# Patient Record
(demographics unavailable — no encounter records)

---

## 2024-10-31 NOTE — HISTORY OF PRESENT ILLNESS
[FreeTextEntry1] : It is a pleasure to see Ms. SURAJ JAY in office today. She is a 75-year-old woman who presents today for a neurologic consultation. She has noticed that since a year ago, she has been forgetful and would sometimes forget what she is doing. She has noticed word-finding issues that she's never had before. She currently lives alone and does everything on her own. She has never had an MRI or EEG done. She had recent bloodwork in September which revealed high B12 levels. She takes daily multivitamins. Denies snoring in her sleep, waking up short of breath, or waking up tired. Highest education was an associate's degree.

## 2024-10-31 NOTE — PHYSICAL EXAM

## 2024-10-31 NOTE — ASSESSMENT
[FreeTextEntry1] : Cognitive impairment-considering her age and how functional she still is, it is more likely to be age-related memory decline -Recent bloodwork results on patient's phone reviewed   -MRI brain w/o contrast -Neuropsychological testing referral   -Bloodwork -EEG  -Advised to increase physical activity  -Follow-up in 1 month  High B12 level -Follow-up with her PMD or nutritionist  Total clinician time spent  is  46 minutes including preparing to see the patient, obtaining and/or reviewing and confirming history, performing a medically necessary and appropriate examination, counseling and educating the patient and/or family, documenting clinical information in the HER and communicating and/or referring to other healthcare professionals.    I, Madie Ledesma, attest that this documentation has been prepared under the direction and in the presence of Provider Buddy Cervantes DO   Thank you for allowing me to assist in the management of this patient.   Buddy Cervantes DO Board Certified, Neurology.

## 2024-11-26 NOTE — PHYSICAL EXAM
[Left Carotid Bruit] : left carotid bruit heard [2+] : left 2+ [Right Carotid Bruit] : no bruit heard over the right carotid

## 2024-11-26 NOTE — ASSESSMENT
[FreeTextEntry1] : The patient is a 75-year-old female who presents for follow-up of asymptomatic bilateral carotid artery stenosis.  I performed a carotid duplex today in my office which shows bilateral 50 to 69% stenosis.  I would like to continue to monitor the patient with a serial duplex exam every 6 months.  I recommend she continue with an antiplatelet regimen as well as lipid-lowering agent daily.     I, Dr. Justin Tabares, personally performed the evaluation and management (E/M) services for this established patient who presents today with (a) new problem(s)/exacerbation of (an) existing condition(s). That E/M includes conducting the clinically appropriate interval history &/or exam, assessing all new/exacerbated conditions, and establishing a new plan of care. Today, my BARBRA, Stacie Huffman PA-C, was here to observe my evaluation and management service for this new problem/exacerbated condition and follow the plan of care established by me going forward.

## 2025-01-03 NOTE — ASSESSMENT
[FreeTextEntry1] : Cognitive impairment-most likely age-related memory decline -MRI of the brain, EEG, blood work result reviewed with patient, and questions answered -Will obtain recent neuropsych testing result - Follow-up with neuropsychologist to review neuropsych testing  B12 toxicity - Recommend to stop multivitamin - Considering seeing a nutritionist if B12 continues to be high  Total clinician time spent  is  35 minutes including preparing to see the patient, obtaining and/or reviewing and confirming history, performing a medically necessary and appropriate examination, counseling and educating the patient and/or family, documenting clinical information in the HER and communicating and/or referring to other healthcare professionals.

## 2025-01-03 NOTE — HISTORY OF PRESENT ILLNESS
[FreeTextEntry1] : Ms. SURAJ JAY returns to the office for follow-up and her prior history and physical have been reviewed and she reports no change since last visit.  she has been seeing us for cognitive impairment and was sent for MRI of the brain which other than ischemia, cerebral meningioma that was seen in the previous MRI, sinus disease, no significant intracranial findings seen.  Her blood work continues to be positive for elevated B12 level, greater than 2000.  Continues to take a daily multivitamin which has B12 in it.  She did have a neuropsych testing with a neuropsychologist, but the report is not available to us at this time.   Blood work was also positive for grated AST/ALT, but she has a history of nonalcoholic steatohepatitis.

## 2025-01-03 NOTE — PHYSICAL EXAM

## 2025-05-29 NOTE — HISTORY OF PRESENT ILLNESS
[FreeTextEntry1] : Ms. Murdock is a 76 year old female with history of asymptomatic carotid artery stenosis presenting for annual follow up, denies any CVA or TIA symptoms.

## 2025-05-29 NOTE — DATA REVIEWED
[FreeTextEntry1] : I performed a carotid duplex which was medically necessary to evaluate for progression of carotid artery stenosis. It showed 50-69% right carotid artery stenosis and >70% left carotid artery stenosis.

## 2025-05-29 NOTE — ASSESSMENT
[FreeTextEntry1] : 76 year-old female who presents for follow-up of asymptomatic bilateral carotid artery stenosis.   I performed a carotid duplex today in my office which showed 50-69% right carotid artery stenosis and >70% left carotid artery stenosis.  I would like to obtain CTA neck for further evaluation and will see her back after the CTA.   I, Dr. Justin Tabares, personally performed the evaluation and management (E/M) services for this established patient who presents today with (a) new problem(s)/exacerbation of (an) existing condition(s). That E/M includes conducting the clinically appropriate interval history &/or exam, assessing all new/exacerbated conditions, and establishing a new plan of care. Today, my BARBRA, Marley Gan PA-C, was here to observe my evaluation and management service for this new problem/exacerbated condition and follow the plan of care established by me going forward.

## 2025-07-03 NOTE — ASSESSMENT
[FreeTextEntry1] : 1. Benign Meningioma: - Stable for 14 years since first identified in 2011 - Plan: Given long-term stability, surveillance imaging can be extended to every 2-5 years - Patient may schedule follow-up imaging PRN  2. cognitive impairment-age related memory decline -neuropsych note reviewed, no degenerative process seen with testing  3. Elevated B12 Levels: - Likely related to multivitamin supplementation - Recommended discontinuation of B12 supplementation while maintaining balanced diet - Advised monitoring B12 levels every 3 months - Consider nutritionist referral if levels continue to fluctuate  Total clinician time spent  is  31 minutes including preparing to see the patient, obtaining and/or reviewing and confirming history, performing a medically necessary and appropriate examination, counseling and educating the patient and/or family, documenting clinical information in the HER and communicating and/or referring to other healthcare professionals.

## 2025-07-03 NOTE — HISTORY OF PRESENT ILLNESS
[FreeTextEntry1] : Patient is a female presenting for follow-up visit. She reports a fall that occurred on January 21, 2025, after her last visit here on January 3, 2025. The fall resulted in a wrist fracture requiring surgical intervention with plate insertion on January 31, 2025. Patient states she 'just stuck to the sidewalk and went down' despite the sidewalk being clean. She reports the fracture has healed well and she was told 'everything is all done' by her treating physicians. Patient also mentions she recently had pre-surgical testing for an upcoming carotid surgery scheduled for July 14, 2025, with  [unknown]. Initial lab work showed elevated potassium and liver enzymes (consistent with her known fatty liver), but repeat potassium testing was normal and she has been cleared for surgery. Patient reports she had neuropsychological testing since her last visit which showed 'above average' cognitive function with no evidence of degenerative processes. She mentions taking magnesium 100mg daily due to a recently identified deficiency, as well as calcium supplements. She also takes a multivitamin containing B12. No new neurological symptoms reported.